# Patient Record
Sex: MALE | Race: ASIAN
[De-identification: names, ages, dates, MRNs, and addresses within clinical notes are randomized per-mention and may not be internally consistent; named-entity substitution may affect disease eponyms.]

---

## 2019-03-26 ENCOUNTER — HOSPITAL ENCOUNTER (EMERGENCY)
Dept: HOSPITAL 26 - MED | Age: 24
LOS: 1 days | Discharge: HOME | End: 2019-03-27
Payer: COMMERCIAL

## 2019-03-26 VITALS — HEIGHT: 71 IN | BODY MASS INDEX: 26.6 KG/M2 | WEIGHT: 190 LBS

## 2019-03-26 VITALS — SYSTOLIC BLOOD PRESSURE: 125 MMHG | DIASTOLIC BLOOD PRESSURE: 79 MMHG
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
## 2019-03-26 DIAGNOSIS — S90.411A: ICD-10-CM
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
## 2019-03-26 DIAGNOSIS — S90.111A: Primary | ICD-10-CM
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
## 2019-03-27 VITALS — DIASTOLIC BLOOD PRESSURE: 64 MMHG | SYSTOLIC BLOOD PRESSURE: 123 MMHG

## 2019-03-27 RX ADMIN — KETOROLAC TROMETHAMINE ONE MG: 60 INJECTION, SOLUTION INTRAMUSCULAR at 01:33

## 2019-03-27 RX ADMIN — KETOROLAC TROMETHAMINE ONE MG: 60 INJECTION, SOLUTION INTRAMUSCULAR at 01:31

## 2019-03-27 NOTE — NUR
24/M PRESENTS TO ED, C/O R GREAT TOE PAIN, S/P ACCIDENTALLY DROPPING A METAL 
PIECE OF A FRYER AT WORK INTO HIS TOE 4 HRS AGO. R GREAT TOE WITH SUPERFICIAL 
ABRASION, ECCHYMOSIS AND MILD SWELLING, +CMS. AOX4, RR EVEN AND UNLABORED.

HX ANEMIA

## 2019-03-27 NOTE — NUR
PT AMBULATED TO BED 2.

-------------------------------------------------------------------------------

Addendum: 03/27/19 at 0109 by [facility]

-------------------------------------------------------------------------------

BED 3.